# Patient Record
Sex: MALE | Race: WHITE
[De-identification: names, ages, dates, MRNs, and addresses within clinical notes are randomized per-mention and may not be internally consistent; named-entity substitution may affect disease eponyms.]

---

## 2021-03-03 DIAGNOSIS — Z23 NEED FOR VACCINATION: ICD-10-CM

## 2022-11-07 ENCOUNTER — TELEPHONE (OUTPATIENT)
Dept: INTERNAL MEDICINE | Facility: OTHER | Age: 72
End: 2022-11-07

## 2022-11-09 NOTE — TELEPHONE ENCOUNTER
Attempted to call the pt to get more information as there is no referral in his chart.    I got somebody who answered the phone and then hung up on me. Will try again later.

## 2022-11-10 NOTE — TELEPHONE ENCOUNTER
Phone Number Called: [phone]    Call outcome:  unable to lm    Message: unable to lm, phone kept ringing and call dropped

## 2022-11-11 NOTE — TELEPHONE ENCOUNTER
Attempted to call the patient again and somebody picked up the phone but then hung up on me.   Pt to call back if they want to schedule an appointment and will need referral first.